# Patient Record
Sex: FEMALE | Race: OTHER | HISPANIC OR LATINO | URBAN - METROPOLITAN AREA
[De-identification: names, ages, dates, MRNs, and addresses within clinical notes are randomized per-mention and may not be internally consistent; named-entity substitution may affect disease eponyms.]

---

## 2022-05-09 ENCOUNTER — TELEPHONE (OUTPATIENT)
Dept: OBSTETRICS AND GYNECOLOGY | Facility: CLINIC | Age: 32
End: 2022-05-09

## 2022-05-09 NOTE — TELEPHONE ENCOUNTER
----- Message from Chel Perikns MA sent at 5/9/2022 10:11 AM CDT -----  Contact: PT    ----- Message -----  From: Nikki Anderson  Sent: 5/9/2022   9:58 AM CDT  To: [facility] Staff    Type:  Needs Medical Advice    Who Called: PT  Symptoms (please be specific):    How long has patient had these symptoms:    Pharmacy name and phone #:    Would the patient rather a call back or a response via [facility]? CALL  Best Call Back Number:  [phone]   No email: No Email Address          Additional Information: [facility] SPEAK[employer] PT 34 WEEKS PREG. NO INS - HAS BEEN HERE ONLY 2 DAYS